# Patient Record
Sex: MALE | URBAN - METROPOLITAN AREA
[De-identification: names, ages, dates, MRNs, and addresses within clinical notes are randomized per-mention and may not be internally consistent; named-entity substitution may affect disease eponyms.]

---

## 2019-02-06 ENCOUNTER — TELEPHONE (OUTPATIENT)
Dept: ORTHOPEDICS | Facility: CLINIC | Age: 54
End: 2019-02-06

## 2019-02-06 NOTE — TELEPHONE ENCOUNTER
----- Message from Lilian Patten RN sent at 2/6/2019 10:57 AM CST -----  Regarding: RE: New Referral- scheduling question  None of our providers do the stem cell treatments    Lilian  ----- Message -----  From: Lilian Alvares RN  Sent: 1/29/2019   3:37 PM  To: Lilian Patten RN, Crystal Link, ATC  Subject: FW: New Referral- scheduling question            Can you guys check with your providers about this.  ----- Message -----  From: Tami Peterson  Sent: 1/29/2019   3:29 PM  To: [facility] [place]  Subject: New Referral- scheduling question                Hello!    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 The referral states that he would like to discuss stem cell treatments for this. Is this something that any of the providers will see for? Please let me know and I will be happy to reach out to the pt.     Thanks!  Tami